# Patient Record
Sex: FEMALE
[De-identification: names, ages, dates, MRNs, and addresses within clinical notes are randomized per-mention and may not be internally consistent; named-entity substitution may affect disease eponyms.]

---

## 2020-10-20 ENCOUNTER — HOSPITAL ENCOUNTER (OUTPATIENT)
Dept: HOSPITAL 5 - SPVWC | Age: 60
Discharge: HOME | End: 2020-10-20
Attending: OBSTETRICS & GYNECOLOGY
Payer: COMMERCIAL

## 2020-10-20 DIAGNOSIS — N90.89: Primary | ICD-10-CM

## 2020-10-20 PROCEDURE — 76857 US EXAM PELVIC LIMITED: CPT

## 2020-10-20 NOTE — ULTRASOUND REPORT
ULTRASOUND PELVIC LIMITED



HISTORY: Vulvar hematoma. Patient had an accident in 2019 resulting in swelling and bruising of her r
ight labia that has not completely resolved.



TECHNIQUE: Transabdominal ultrasound with color Doppler imaging.



FINDINGS: Targeted ultrasound was performed on the right labia at the site of swelling. The images de
monstrate a heterogeneous lesion measuring 4.8 x 2.7 x 3.5 cm. There appears to be debris moving with
in this structure making this consistent with a partially cystic lesion. No significant internal perf
usion is identified on color Doppler imaging. No obvious internal gas or calcifications.



IMPRESSION:

Probable right labial hematoma as described above.



Signer Name: Romain Hopkins Jr, MD 

Signed: 10/20/2020 2:19 PM

Workstation Name: [id number]